# Patient Record
Sex: MALE | Race: WHITE | URBAN - METROPOLITAN AREA
[De-identification: names, ages, dates, MRNs, and addresses within clinical notes are randomized per-mention and may not be internally consistent; named-entity substitution may affect disease eponyms.]

---

## 2019-11-11 ENCOUNTER — OFFICE VISIT (OUTPATIENT)
Dept: FAMILY MEDICINE | Facility: CLINIC | Age: 39
End: 2019-11-11
Payer: COMMERCIAL

## 2019-11-11 VITALS
TEMPERATURE: 97.1 F | HEART RATE: 94 BPM | OXYGEN SATURATION: 98 % | DIASTOLIC BLOOD PRESSURE: 86 MMHG | WEIGHT: 194 LBS | SYSTOLIC BLOOD PRESSURE: 135 MMHG | HEIGHT: 72 IN | BODY MASS INDEX: 26.28 KG/M2

## 2019-11-11 DIAGNOSIS — Z72.0 TOBACCO ABUSE: Primary | ICD-10-CM

## 2019-11-11 DIAGNOSIS — Z23 NEED FOR PROPHYLACTIC VACCINATION AND INOCULATION AGAINST INFLUENZA: ICD-10-CM

## 2019-11-11 DIAGNOSIS — F41.1 GAD (GENERALIZED ANXIETY DISORDER): ICD-10-CM

## 2019-11-11 PROCEDURE — 99203 OFFICE O/P NEW LOW 30 MIN: CPT | Mod: 25 | Performed by: FAMILY MEDICINE

## 2019-11-11 PROCEDURE — 90686 IIV4 VACC NO PRSV 0.5 ML IM: CPT | Performed by: FAMILY MEDICINE

## 2019-11-11 PROCEDURE — 90471 IMMUNIZATION ADMIN: CPT | Performed by: FAMILY MEDICINE

## 2019-11-11 RX ORDER — BUPROPION HYDROCHLORIDE 150 MG/1
TABLET, FILM COATED, EXTENDED RELEASE ORAL
Qty: 60 TABLET | Refills: 6 | Status: SHIPPED | OUTPATIENT
Start: 2019-11-11

## 2019-11-11 RX ORDER — NICOTINE 21 MG/24HR
1 PATCH, TRANSDERMAL 24 HOURS TRANSDERMAL EVERY 24 HOURS
Qty: 14 PATCH | Refills: 0 | Status: SHIPPED | OUTPATIENT
Start: 2019-11-11 | End: 2020-02-10

## 2019-11-11 RX ORDER — HYDROXYZINE HYDROCHLORIDE 50 MG/1
50 TABLET, FILM COATED ORAL
Qty: 30 TABLET | Refills: 1 | Status: SHIPPED | OUTPATIENT
Start: 2019-11-11

## 2019-11-11 ASSESSMENT — PAIN SCALES - GENERAL: PAINLEVEL: NO PAIN (0)

## 2019-11-11 ASSESSMENT — MIFFLIN-ST. JEOR: SCORE: 1844.98

## 2019-11-11 NOTE — PROGRESS NOTES
Subjective     Kael Borrero is a 39 year old male who presents to clinic today for the following health issues:    HPI   Patient would like to discuss quit plan for smoking.    Patient with history of tobacco abuse.  He reports he smokes 1 pack/day for the past 20 years or so.  He reports over 2 years ago he was on Zyban he thinks it did help and he would like to try it again.        He also concerned about anxiety sometimes difficult for him to fall asleep.  He denies any depression, denies any alcohol abuse or drugs abuse.  Denies suicidal thoughts or ideation.  He does report he works long hours.  He has a lot of stress at work.  Otherwise,  he denies any social conflict.    Denies panic attack, denies chest pain, shortness of breath.  Has no recent sickness.    Social History     Tobacco Use     Smoking status: Current Every Day Smoker     Packs/day: 1.00     Years: 20.00     Pack years: 20.00     Types: Cigarettes     Smokeless tobacco: Never Used   Substance Use Topics     Alcohol use: Yes     Comment: Socially        Current Outpatient Medications   Medication Sig Dispense Refill     buPROPion (ZYBAN) 150 MG 12 hr tablet One tab daily for 3 days, then bid , 60 tablet 6     hydrOXYzine (ATARAX) 50 MG tablet Take 1 tablet (50 mg) by mouth nightly as needed for itching 30 tablet 1     nicotine (NICODERM CQ) 14 MG/24HR 24 hr patch Place 1 patch onto the skin every 24 hours 14 patch 0     nicotine (NICODERM CQ) 21 MG/24HR 24 hr patch Place 1 patch onto the skin every 24 hours 14 patch 0     nicotine (NICODERM CQ) 7 MG/24HR 24 hr patch Place 1 patch onto the skin every 24 hours 14 patch 0     No Known Allergies  No lab results found.     Reviewed and updated as needed this visit by Provider         Review of Systems   ROS COMP: Constitutional, HEENT, cardiovascular, pulmonary, gi and gu systems are negative, except as otherwise noted.      Objective    There were no vitals taken for this visit.  There is no  height or weight on file to calculate BMI.  Physical Exam   GENERAL: healthy, alert and no distress  RESP: lungs clear to auscultation - no rales, rhonchi or wheezes  CV: regular rate and rhythm, normal S1 S2, no S3 or S4, no murmur, click or rub, no peripheral edema and peripheral pulses strong  MS: no gross musculoskeletal defects noted, no edema  PSYCH: mentation appears normal, affect normal/bright    Diagnostic Test Results:  Labs reviewed in Epic  none         Assessment & Plan     1. Tobacco abuse  I did congratulate the patient regarding his  effort for quitting smoking.  I did advise him and discussed with him the long-term benefits of quitting smoking.  He was started on Zyban, nicotine patches.  - buPROPion (ZYBAN) 150 MG 12 hr tablet; One tab daily for 3 days, then bid ,  Dispense: 60 tablet; Refill: 6  - nicotine (NICODERM CQ) 21 MG/24HR 24 hr patch; Place 1 patch onto the skin every 24 hours  Dispense: 14 patch; Refill: 0  - nicotine (NICODERM CQ) 14 MG/24HR 24 hr patch; Place 1 patch onto the skin every 24 hours  Dispense: 14 patch; Refill: 0  - nicotine (NICODERM CQ) 7 MG/24HR 24 hr patch; Place 1 patch onto the skin every 24 hours  Dispense: 14 patch; Refill: 0    2. BEN (generalized anxiety disorder)  He was advised with the relaxation technique, avoid late meals, avoid coffee, or soda.  Starting Atarax use as been prescribed.  - hydrOXYzine (ATARAX) 50 MG tablet; Take 1 tablet (50 mg) by mouth nightly as needed for itching  Dispense: 30 tablet; Refill: 1    3. Need for prophylactic vaccination and inoculation against influenza  Given flu vaccine today.  - INFLUENZA VACCINE IM > 6 MONTHS VALENT IIV4 [72772]  - Vaccine Administration, Initial [93069]     Tobacco Cessation:   reports that he has been smoking cigarettes. He has a 20.00 pack-year smoking history. He has never used smokeless tobacco.  Started medications.    BMI:   Estimated body mass index is 25.99 kg/m  as calculated from the  "following:    Height as of this encounter: 1.84 m (6' 0.44\").    Weight as of this encounter: 88 kg (194 lb).   Weight management plan: Discussed healthy diet and exercise guidelines        See Patient Instructions    Return in about 4 weeks (around 12/9/2019) for Physical Exam.    Angle Armenta MD  [facility]    "

## 2020-02-07 DIAGNOSIS — Z72.0 TOBACCO ABUSE: ICD-10-CM

## 2020-02-07 NOTE — TELEPHONE ENCOUNTER
"Requested Prescriptions   Pending Prescriptions Disp Refills     nicotine (NICODERM CQ) 14 MG/24HR 24 hr patch 14 patch 0     Sig: Place 1 patch onto the skin every 24 hours   Last Written Prescription Date:  11/11/19  Last Fill Quantity: 14,  # refills: 0   Last office visit: 11/11/2019 with prescribing provider:     Future Office Visit:        Partial Cholinergic Nicotinic Agonist Agents Passed - 2/7/2020  8:59 AM        Passed - Blood pressure under 140/90 in past 12 months     BP Readings from Last 3 Encounters:   11/11/19 135/86                 Passed - Recent (12 mo) or future (30 days) visit within the authorizing provider's specialty     Patient has had an office visit with the authorizing provider or a provider within the authorizing providers department within the previous 12 mos or has a future within next 30 days. See \"Patient Info\" tab in inbasket, or \"Choose Columns\" in Meds & Orders section of the refill encounter.              Passed - Medication is active on med list        Passed - Patient is 18 years of age or older        nicotine (NICODERM CQ) 7 MG/24HR 24 hr patch 14 patch 0     Sig: Place 1 patch onto the skin every 24 hours   Last Written Prescription Date:  11/11/19  Last Fill Quantity: 14,  # refills: 0   Last office visit: 11/11/2019 with prescribing provider:     Future Office Visit:        Partial Cholinergic Nicotinic Agonist Agents Passed - 2/7/2020  8:59 AM        Passed - Blood pressure under 140/90 in past 12 months     BP Readings from Last 3 Encounters:   11/11/19 135/86                 Passed - Recent (12 mo) or future (30 days) visit within the authorizing provider's specialty     Patient has had an office visit with the authorizing provider or a provider within the authorizing providers department within the previous 12 mos or has a future within next 30 days. See \"Patient Info\" tab in inbasket, or \"Choose Columns\" in Meds & Orders section of the refill encounter.              " Passed - Medication is active on med list        Passed - Patient is 18 years of age or older

## 2020-02-07 NOTE — TELEPHONE ENCOUNTER
"Requested Prescriptions   Pending Prescriptions Disp Refills     nicotine (NICODERM CQ) 21 MG/24HR 24 hr patch 14 patch 0     Sig: Place 1 patch onto the skin every 24 hours   Last Written Prescription Date:  11/11/19  Last Fill Quantity: 14,  # refills: 0   Last office visit: 11/11/2019 with prescribing provider:     Future Office Visit:        Partial Cholinergic Nicotinic Agonist Agents Passed - 2/7/2020  8:55 AM        Passed - Blood pressure under 140/90 in past 12 months     BP Readings from Last 3 Encounters:   11/11/19 135/86                 Passed - Recent (12 mo) or future (30 days) visit within the authorizing provider's specialty     Patient has had an office visit with the authorizing provider or a provider within the authorizing providers department within the previous 12 mos or has a future within next 30 days. See \"Patient Info\" tab in inbasket, or \"Choose Columns\" in Meds & Orders section of the refill encounter.              Passed - Medication is active on med list        Passed - Patient is 18 years of age or older          "

## 2020-02-10 RX ORDER — NICOTINE 21 MG/24HR
1 PATCH, TRANSDERMAL 24 HOURS TRANSDERMAL EVERY 24 HOURS
Qty: 14 PATCH | Refills: 0 | Status: SHIPPED | OUTPATIENT
Start: 2020-02-10

## 2020-02-10 NOTE — TELEPHONE ENCOUNTER
Prescription approved per [facility] Refill Protocol.  Sera Kunz RN

## 2022-10-28 NOTE — PATIENT INSTRUCTIONS
Patient Education     Understanding Smokeless Tobacco   Smokeless tobacco products are made from the tobacco plant. They are harmful to your body. Smokeless tobacco causes oral cancer, esophageal cancer, and pancreatic cancer. These products are not safer than other forms of tobacco. Tobacco is not safe in any form.   Smokeless tobacco is used by about 7 in 100 [place] adults. Most users are men. About 1 in 10 adolescent boys also use it.   What is smokeless tobacco?  Smokeless tobacco comes in 3 types. These are chewing tobacco, snuff, and dissolvables. Many of these products are flavored to make them more appealing.   Snuff is the most widely used kind of smokeless tobacco. This finely ground tobacco can be dry or moist. The dry powder is sniffed. Moist snuff is put in the mouth between the gums and cheeks. Often packaged in round cans, it s called dip. Snuff also includes snus. This is moist tobacco sold in small pouches.   Chewing tobacco--or chew--is sold as loose leaves, plugs, or twists. A piece is pressed between the gums and cheek. Dissolvables are tobacco that has been made into small shapes, like lozenges or tablets. They are often sucked on until they melt.   How is smokeless tobacco bad for you?  All tobacco products contain dangerous chemicals. Tobacco use is not safe in any amount or in any form.   All forms of tobacco have nicotine in them. Nicotine is very addictive. You can become physically and emotionally hooked on it. You may even crave it. In children, nicotine can harm the developing brain.   Smokeless tobacco also has at least 30 harmful chemicals. The worst are tobacco-specific nitrosamines. These come from the way tobacco is processed. You may also be exposed to other chemicals like lead and mercury. All of these have been linked to cancer. If you use smokeless tobacco, you are more at risk for cancer of the mouth, esophagus, and pancreas.   Smokeless tobacco may also lead to other health  problems. These include:    Heart disease and stroke    Small white patches in the mouth that can turn into cancer (leukoplakia)    Gum disease, tooth decay, and tooth loss    Stained teeth and bad breath    Early birth or miscarriage if used while pregnant    How can you quit smokeless tobacco?  Quitting any form of tobacco can be hard to do. The nicotine can cause physical, mental, and emotional withdrawal symptoms. You may have headaches, depression, and trouble sleeping. You may feel anxious, angry, tired, or restless. You may also feel the need to put something in your mouth to replace the chew, dip, or other smokeless tobacco product.  If you want to quit, talk with your healthcare provider, pharmacist, or dentist. He or she can tell you about all your options. Many of the same tactics people use to quit cigarettes may help you. You may want to try a support group, a tobacco cessation program, medicine, or nicotine replacement therapy. Many resources are also available through your smart phone.      More resources    American Lung Association    [facility]    National Cancer [place]   Date Last Reviewed: 5/1/2017 2000-2018 [employer]. [address] [place] [address]. All rights reserved. This information is not intended as a substitute for professional medical care. Always follow your healthcare professional's instructions.           Patient Education     How to Quit Smoking  Smoking is one of the hardest habits to break. About half of all people who have ever smoked have been able to quit. Most people who still smoke want to quit. Here are some of the best ways to stop smoking.    Keep trying  Most smokers make many attempts at quitting before they are successful. It s important not to give up.  Go cold turkey  Most former smokers quit cold turkey (all at once). Trying to cut back gradually doesn't seem to work as well, perhaps because it continues the smoking habit. Also, it is  possible to inhale more while smoking fewer cigarettes. This results in the same amount of nicotine in your body.  Get support  Support programs can be a big help, especially for heavy smokers. These groups offer lectures, ways to change behavior, and peer support. Here are some ways to find a support program:    Free national quitline: 800-QUIT-NOW (017-046-0259).    Hospital quit-smoking programs.    American Lung Association: (323.909.7689).    American Cancer Society (350-526-6986).  Support at home is important too. Nonsmokers can offer praise and encouragement. If the smoker in your life finds it hard to quit, encourage them to keep trying.  Over-the-counter medicines  Nicotine replacement therapy may make quitting easier. Certain aids, such as the nicotine patch, gum, and lozenges, are available without a prescription. It is best to use these under a doctor s care, though. The skin patch provides a steady supply of nicotine. Nicotine gum and lozenges give temporary bursts of low levels of nicotine. Both methods reduce the craving for cigarettes. Warning: If you have nausea, vomiting, dizziness, weakness, or a fast heartbeat, stop using these products and see your doctor.  Prescription medicines  After reviewing your smoking patterns and past attempts to quit, your doctor may offer a prescription medicine such as bupropion, varenicline, a nicotine inhaler, or nasal spray. Each has advantages and side effects. Your doctor can review these with you.  Health benefits of quitting  The benefits of quitting start right away and keep improving the longer you go without smoking. These benefits occur at any age.  So whether you are 17 or 70, quitting is a good decision. Some of the benefits include:    20 minutes: Blood pressure and pulse return to normal.    8 hours: Oxygen levels return to normal.    2 days: Ability to smell and taste begin to improve as damaged nerves regrow.    2 to 3 weeks: Circulation and lung  "function improve.    1 to 9 months: Coughing, congestion, and shortness of breath decrease; tiredness decreases.    1 year: Risk of heart attack decreases by half.    5 years: Risk of lung cancer decreases by half; risk of stroke becomes the same as a nonsmoker s.  For more on how to quit smoking, try these online resources:     Smokefree.gov    \"Clearing the Air\" booklet from the National [employer]: smokefree.gov/sites/default/files/pdf/clearing-the-air-accessible.pdf  Date Last Reviewed: 3/1/2017    2660-3565 "[employer]". [address] [place] [address]. All rights reserved. This information is not intended as a substitute for professional medical care. Always follow your healthcare professional's instructions.           " Yes